# Patient Record
Sex: MALE | Race: ASIAN | NOT HISPANIC OR LATINO | Employment: FULL TIME | URBAN - METROPOLITAN AREA
[De-identification: names, ages, dates, MRNs, and addresses within clinical notes are randomized per-mention and may not be internally consistent; named-entity substitution may affect disease eponyms.]

---

## 2017-03-21 ENCOUNTER — OFFICE VISIT - HEALTHEAST (OUTPATIENT)
Dept: FAMILY MEDICINE | Facility: CLINIC | Age: 42
End: 2017-03-21

## 2017-03-21 DIAGNOSIS — R50.9 FEVER: ICD-10-CM

## 2017-03-21 DIAGNOSIS — J02.0 STREP THROAT: ICD-10-CM

## 2019-05-21 ENCOUNTER — OFFICE VISIT - HEALTHEAST (OUTPATIENT)
Dept: FAMILY MEDICINE | Facility: CLINIC | Age: 44
End: 2019-05-21

## 2019-05-21 DIAGNOSIS — J02.0 STREP PHARYNGITIS: ICD-10-CM

## 2019-05-21 DIAGNOSIS — J30.2 SEASONAL ALLERGIC RHINITIS, UNSPECIFIED TRIGGER: ICD-10-CM

## 2019-05-21 LAB — DEPRECATED S PYO AG THROAT QL EIA: ABNORMAL

## 2019-05-21 RX ORDER — ALBUTEROL SULFATE 90 UG/1
2 AEROSOL, METERED RESPIRATORY (INHALATION) EVERY 6 HOURS PRN
Qty: 1 EACH | Refills: 0 | Status: SHIPPED | OUTPATIENT
Start: 2019-05-21

## 2021-05-30 VITALS — WEIGHT: 144.3 LBS

## 2021-06-02 VITALS — WEIGHT: 145.1 LBS

## 2021-06-09 NOTE — PROGRESS NOTES
ASSESSMENT:   1. Strep throat  amoxicillin (AMOXIL) 500 MG capsule   2. Fever  Influenza A/B Rapid Test    Rapid Strep A Screen-Throat    ibuprofen (ADVIL,MOTRIN) 200 MG tablet        PLAN:  Rx: amoxicillin for strep throat.   Change toothbrush in 24 hours.  Contagious for 24 hours after starting antibiotic.  Ibuprofen 400mg every 6-8 hours as needed.  Take each dose with food.      F/u with PCP if not improving in 3-5 days, sooner if worsening.       SUBJECTIVE:   Devonte Suarez is a 41 y.o. male presents today with 3 days complaint of sore throat. He also complains of rhinorrhea, nasal congestion, cough, decreased appetite, subjective fever, sore throat, body aches, nausea.  Tolerating light diet and drinking good fluids Sick contacts: daughter has similar symptoms. Has tried Dayquil without relief.     Denies vomiting,diarrhea.    There is no problem list on file for this patient.      History   Smoking Status     Not on file   Smokeless Tobacco     Not on file       Current Medications:  No current outpatient prescriptions on file prior to visit.     No current facility-administered medications on file prior to visit.        Allergies:   No Known Allergies    OBJECTIVE:   Vitals:    03/21/17 1536   BP: 120/78   Pulse: (!) 104   Resp: 20   Temp: 99  F (37.2  C)   TempSrc: Oral   SpO2: 99%   Weight: 144 lb 4.8 oz (65.5 kg)     Physical exam reveals a pleasant 41 y.o. male.   Appears healthy, alert, cooperative and in NAD.  Eyes:  No conjunctivitis, lids normal.   Ears:  normal TMs bilaterally  Nose:    Mucosa normal. Scant clear rhinorrhea.  Mouth: Oral mucosa pink and moist, moderate erythema of tonsils. 2+ tonsillar hypertrophy. No exudate or palatal petechiae. Uvula midline.    Lymph: moderate tender anterior cervical LAD  Lungs: Chest is clear, no wheezing, rhonchi or rales. Symmetric air entry throughout both lung fields.  Heart: regular rate and rhythm, no murmur, rub or gallop  Abdomen: soft, nontender. No  masses or hepatosplenomegaly  Skin: pink, warm, dry, and without lesions on limited skin exam.     Recent Results (from the past 24 hour(s))   Influenza A/B Rapid Test   Result Value Ref Range    Influenza  A, Rapid Antigen No Influenza A antigen detected No Influenza A antigen detected    Influenza B, Rapid Antigen No Influenza B antigen detected No Influenza B antigen detected   Rapid Strep A Screen-Throat   Result Value Ref Range    Rapid Strep A Antigen Group A Strep detected (!) No Group A Strep detected

## 2021-06-17 NOTE — PATIENT INSTRUCTIONS - HE
Patient Instructions by Alexandro Solorzano PA-C at 5/21/2019  2:10 PM     Author: Alexandro Solorzano PA-C Service: -- Author Type: Physician Assistant    Filed: 5/21/2019  2:58 PM Encounter Date: 5/21/2019 Status: Addendum    : Alexandro Solorzano PA-C (Physician Assistant)    Related Notes: Original Note by Alexandro Solorzano PA-C (Physician Assistant) filed at 5/21/2019  2:56 PM       Your rapid strep test was positive today. We will treat with a course of antibiotics. Please complete the full course of antibiotics. You can take your medication with food and with a probiotic such as Culturelle to prevent stomach irritation. You will be contagious for 24 hours following initiation of the medication.    You may use Tylenol or Motrin for pain and fevers.    May drink warm tea, gargle saline solution, or use throat lozenges to sooth throat pain.    Change toothbrush after 48 hours of starting the antibiotic to prevent reinfection.    Watch for resolution of symptoms in the next few days. If you continue to have high fevers, begin to have difficulty swallowing or breathing, notice worsening neck pain, or difficulty moving neck, please return to clinic or present to the emergency room immediately. Otherwise, follow up with your primary care provider as needed.    Patient Education     Allergic Rhinitis  Allergic rhinitis is an allergic reaction that affects the nose, and often the eyes. Its often known as nasal allergies. Nasal allergies are often due to things in the environment that are breathed in. Depending what you are sensitive to, nasal allergies may occur only during certain seasons. Or they may occur year round. Common indoor allergens include house dust mites, mold, cockroaches, and pet dander. Outdoor allergens include pollen from trees, grasses, and weeds.   Symptoms include a drippy, stuffy, and itchy nose. They also include sneezing and red and itchy eyes. You may feel tired more often. Severe  allergies may also affect your breathing and trigger a condition called asthma.   Tests can be done to see what allergens are affecting you. You may be referred to an allergy specialist for testing and further evaluation.  Home care  Your healthcare provider may prescribe medicines to help relieve allergy symptoms. These may include oral medicines, nasal sprays, or eye drops.  Ask your provider for advice on how to avoid substances that you are allergic to. Below are a few tips for each type of allergen.  Pet dander:    Do not have pets with fur and feathers.    If you can't avoid having a pet, keep it out of your bedroom and off upholstered furniture.  Pollen:    When pollen counts are high, keep windows of your car and home closed. If possible, use an air conditioner instead.    Wear a filter mask when mowing or doing yard work.  House dust mites:    Wash bedding every week in warm water and detergent and dry on a hot setting.    Cover the mattress, box spring, and pillows with allergy covers.     If possible, sleep in a room with no carpet, curtains, or upholstered furniture.  Cockroaches:    Store food in sealed containers.    Remove garbage from the home promptly.    Fix water leaks  Mold:    Keep humidity low by using a dehumidifier or air conditioner. Keep the dehumidifier and air conditioner clean and free of mold.    Clean moldy areas with bleach and water.  In general:    Vacuum once or twice a week. If possible, use a vacuum with a high-efficiency particulate air (HEPA) filter.    Do not smoke. Avoid cigarette smoke. Cigarette smoke is an irritant that can make symptoms worse.  Follow-up care  Follow up as advised by the healthcare provider or our staff. If you were referred to an allergy specialist, make this appointment promptly.  When to seek medical advice  Call your healthcare provider right away if the following occur:    Coughing or wheezing    Fever of 100.4 F (38 C) or higher, or as directed by  your healthcare provider    Raised red bumps (hives)    Continuing symptoms, new symptoms, or worsening symptoms  Call 911 if you have:    Trouble breathing    Severe swelling of the face or severe itching of the eyes or mouth  Date Last Reviewed: 3/1/2017    1592-6236 [employer]. [address] [place] [address]. All rights reserved. This information is not intended as a substitute for professional medical care. Always follow your healthcare professional's instructions.

## 2021-06-27 NOTE — PROGRESS NOTES
Progress Notes by Alexandro Solorzano PA-C at 5/21/2019  2:10 PM     Author: Alexandro Solorzano PA-C Service: -- Author Type: Physician Assistant    Filed: 5/23/2019  7:33 PM Encounter Date: 5/21/2019 Status: Signed    : Alexandro Solorzano PA-C (Physician Assistant)         Assessment:       Strep throat.  Allergic rhinitis.      Plan:       Flonase.  Albuterol.  Penicillin.  Change toothbrush.  Probitoics.  Salt water gargles, throat lozenges, warm tea with honey PRN.  Discussed signs of worsening symptoms and when to follow-up with PCP if no symptom improvement.      Patient Instructions   Your rapid strep test was positive today. We will treat with a course of antibiotics. Please complete the full course of antibiotics. You can take your medication with food and with a probiotic such as Culturelle to prevent stomach irritation. You will be contagious for 24 hours following initiation of the medication.    You may use Tylenol or Motrin for pain and fevers.    May drink warm tea, gargle saline solution, or use throat lozenges to sooth throat pain.    Change toothbrush after 48 hours of starting the antibiotic to prevent reinfection.    Watch for resolution of symptoms in the next few days. If you continue to have high fevers, begin to have difficulty swallowing or breathing, notice worsening neck pain, or difficulty moving neck, please return to clinic or present to the emergency room immediately. Otherwise, follow up with your primary care provider as needed.    Patient Education     Allergic Rhinitis  Allergic rhinitis is an allergic reaction that affects the nose, and often the eyes. Its often known as nasal allergies. Nasal allergies are often due to things in the environment that are breathed in. Depending what you are sensitive to, nasal allergies may occur only during certain seasons. Or they may occur year round. Common indoor allergens include house dust mites, mold, cockroaches, and pet dander.  Outdoor allergens include pollen from trees, grasses, and weeds.   Symptoms include a drippy, stuffy, and itchy nose. They also include sneezing and red and itchy eyes. You may feel tired more often. Severe allergies may also affect your breathing and trigger a condition called asthma.   Tests can be done to see what allergens are affecting you. You may be referred to an allergy specialist for testing and further evaluation.  Home care  Your healthcare provider may prescribe medicines to help relieve allergy symptoms. These may include oral medicines, nasal sprays, or eye drops.  Ask your provider for advice on how to avoid substances that you are allergic to. Below are a few tips for each type of allergen.  Pet dander:    Do not have pets with fur and feathers.    If you can't avoid having a pet, keep it out of your bedroom and off upholstered furniture.  Pollen:    When pollen counts are high, keep windows of your car and home closed. If possible, use an air conditioner instead.    Wear a filter mask when mowing or doing yard work.  House dust mites:    Wash bedding every week in warm water and detergent and dry on a hot setting.    Cover the mattress, box spring, and pillows with allergy covers.     If possible, sleep in a room with no carpet, curtains, or upholstered furniture.  Cockroaches:    Store food in sealed containers.    Remove garbage from the home promptly.    Fix water leaks  Mold:    Keep humidity low by using a dehumidifier or air conditioner. Keep the dehumidifier and air conditioner clean and free of mold.    Clean moldy areas with bleach and water.  In general:    Vacuum once or twice a week. If possible, use a vacuum with a high-efficiency particulate air (HEPA) filter.    Do not smoke. Avoid cigarette smoke. Cigarette smoke is an irritant that can make symptoms worse.  Follow-up care  Follow up as advised by the healthcare provider or our staff. If you were referred to an allergy specialist,  make this appointment promptly.  When to seek medical advice  Call your healthcare provider right away if the following occur:    Coughing or wheezing    Fever of 100.4 F (38 C) or higher, or as directed by your healthcare provider    Raised red bumps (hives)    Continuing symptoms, new symptoms, or worsening symptoms  Call 911 if you have:    Trouble breathing    Severe swelling of the face or severe itching of the eyes or mouth  Date Last Reviewed: 3/1/2017    9051-4568 [employer]. [address] [place] [address]. All rights reserved. This information is not intended as a substitute for professional medical care. Always follow your healthcare professional's instructions.             Subjective:        History was provided by the patient. Is here with his wife and son.  Devonte Suarez is a 43 y.o. male who presents for evaluation of a sore throat. Associated symptoms include rhinorrhea, sneezing, and itchy eyes. Onset of symptoms was 1 day ago, gradually worsening since that time.  He is drinking plenty of fluids. He has not had recent close exposure to someone with proven streptococcal pharyngitis. Patient denies fevers and shortness of breath. He has history of seasonal allergies for which he uses Flonase and an albuterol inhaler with good relief.    The following portions of the patient's history were reviewed and updated as appropriate: allergies, current medications and problem list.    Review of Systems  Pertinent items are noted in HPI.    Allergies  No Known Allergies      Objective:       /76 (Patient Site: Right Arm, Patient Position: Sitting, Cuff Size: Adult Regular)   Pulse 73   Temp 98  F (36.7  C)   Resp 20   Wt 145 lb 1.6 oz (65.8 kg)   SpO2 97%   General appearance: alert, appears stated age, cooperative, no distress and non-toxic  Head: Normocephalic, without obvious abnormality, atraumatic, sinuses nontender to percussion  Ears: TM's intact with mucoid fluid, no  erythema or bulging; external ears normal  Nose: no discharge  Throat: moderate posterior oropharynx ertyhema; no tonsil swelling or exudate; MMM, lips and tongue normal  Neck: mild anterior cervical adenopathy and supple, symmetrical, trachea midline  Lungs: clear to auscultation bilaterally  Heart: regular rate and rhythm, S1, S2 normal, no murmur, click, rub or gallop    Lab Results    Recent Results (from the past 24 hour(s))   Rapid Strep A Screen-Throat   Result Value Ref Range    Rapid Strep A Antigen Group A Strep detected (!) No Group A Strep detected, presumptive negative       I personally reviewed these results and discussed findings with the patient.

## 2022-12-29 ENCOUNTER — OFFICE VISIT (OUTPATIENT)
Dept: FAMILY MEDICINE | Facility: CLINIC | Age: 47
End: 2022-12-29
Payer: COMMERCIAL

## 2022-12-29 VITALS
TEMPERATURE: 98.3 F | SYSTOLIC BLOOD PRESSURE: 151 MMHG | RESPIRATION RATE: 16 BRPM | OXYGEN SATURATION: 98 % | DIASTOLIC BLOOD PRESSURE: 97 MMHG | HEART RATE: 85 BPM | WEIGHT: 148 LBS

## 2022-12-29 DIAGNOSIS — J30.2 SEASONAL ALLERGIC RHINITIS, UNSPECIFIED TRIGGER: Primary | ICD-10-CM

## 2022-12-29 DIAGNOSIS — R03.0 ELEVATED BLOOD PRESSURE READING WITHOUT DIAGNOSIS OF HYPERTENSION: ICD-10-CM

## 2022-12-29 PROCEDURE — 99203 OFFICE O/P NEW LOW 30 MIN: CPT | Performed by: PHYSICIAN ASSISTANT

## 2022-12-29 RX ORDER — FLUTICASONE PROPIONATE 50 MCG
2 SPRAY, SUSPENSION (ML) NASAL DAILY
Qty: 18 ML | Refills: 0 | Status: SHIPPED | OUTPATIENT
Start: 2022-12-29 | End: 2023-01-19

## 2022-12-29 RX ORDER — CETIRIZINE HYDROCHLORIDE 10 MG/1
10 TABLET ORAL DAILY PRN
Qty: 60 TABLET | Refills: 0 | Status: SHIPPED | OUTPATIENT
Start: 2022-12-29

## 2022-12-29 NOTE — PATIENT INSTRUCTIONS
Improve your sinus hygiene by:  Flonase/fluticasone nasal spray 1-2 sprays in each nostril once a day. This may take several days to become effective.     Zyrtec can be helpful if your symptoms get worse or have  a bad day if congestion, sneezing or runny nose. But only take this as needed

## 2022-12-29 NOTE — PROGRESS NOTES
Chief Complaint   Patient presents with     Sinus Problem     Pressure on face, trouble of breathing, congested x 3 months        ASSESSMENT/PLAN:  Devonte was seen today for sinus problem.    Diagnoses and all orders for this visit:    Seasonal allergic rhinitis, unspecified trigger  -     cetirizine (ZYRTEC) 10 MG tablet; Take 1 tablet (10 mg) by mouth daily as needed for rhinitis  -     fluticasone (FLONASE) 50 MCG/ACT nasal spray; Spray 2 sprays into both nostrils daily for 21 days    Elevated blood pressure reading without diagnosis of hypertension  -     Primary Care Referral; Future    Patient appears well today with some what sounds like chronic rhinitis likely allergy related.  Started on Flonase and can use Zyrtec as needed.  Elevated BP today.  Asymptomatic.  No previous to compare to.  Discussed home blood pressure logs and establish care with PCP    Daniel Ziegler PA-C      SUBJECTIVE:  Devonte is a 47 year old male who presents to urgent care withPatient with several years of nasal congestion, sneezing and runny nose.  Has been treated with a nasal spray in the past.  He otherwise feels well.  No shortness of breath, chest pain, sinus pain or pressure, no fevers or chills    He also has some concerns because his blood pressure was elevated today.  ROS: Pertinent ROS neg other than the symptoms noted above in the HPI.     OBJECTIVE:  BP (!) 151/97 (BP Location: Right arm, Patient Position: Sitting, Cuff Size: Adult Regular)   Pulse 85   Temp 98.3  F (36.8  C) (Oral)   Resp 16   Wt 67.1 kg (148 lb)   SpO2 98%    GENERAL: healthy, alert and no distress  EYES: Eyes grossly normal to inspection, PERRL and conjunctivae and sclerae normal  HENT: ear canals and TM's normal, nose and mouth without ulcers or lesions, mild nasal mucosal edema    DIAGNOSTICS    No results found for any visits on 12/29/22.     No current outpatient medications on file.     No current facility-administered medications for  this visit.      There is no problem list on file for this patient.     No past medical history on file.  No past surgical history on file.  No family history on file.  Social History     Tobacco Use     Smoking status: Never     Smokeless tobacco: Never   Substance Use Topics     Alcohol use: Yes     Comment: occasionally              The plan of care was discussed with the patient. They understand and agree with the course of treatment prescribed. A printed summary was given including instructions and medications.  The use of Dragon/[employer] dictation services may have been used to construct the content in this note; any grammatical or spelling errors are non-intentional. Please contact the author of this note directly if you are in need of any clarification.

## 2025-01-04 ENCOUNTER — OFFICE VISIT (OUTPATIENT)
Dept: URGENT CARE | Facility: URGENT CARE | Age: 50
End: 2025-01-04
Payer: COMMERCIAL

## 2025-01-04 VITALS
TEMPERATURE: 99.7 F | SYSTOLIC BLOOD PRESSURE: 130 MMHG | HEART RATE: 105 BPM | WEIGHT: 148.2 LBS | OXYGEN SATURATION: 98 % | RESPIRATION RATE: 18 BRPM | DIASTOLIC BLOOD PRESSURE: 82 MMHG

## 2025-01-04 DIAGNOSIS — K12.2 UVULITIS: Primary | ICD-10-CM

## 2025-01-04 DIAGNOSIS — J30.2 SEASONAL ALLERGIC RHINITIS, UNSPECIFIED TRIGGER: ICD-10-CM

## 2025-01-04 DIAGNOSIS — J02.9 SORE THROAT: ICD-10-CM

## 2025-01-04 LAB
DEPRECATED S PYO AG THROAT QL EIA: NEGATIVE
S PYO DNA THROAT QL NAA+PROBE: NOT DETECTED

## 2025-01-04 PROCEDURE — 87651 STREP A DNA AMP PROBE: CPT | Performed by: STUDENT IN AN ORGANIZED HEALTH CARE EDUCATION/TRAINING PROGRAM

## 2025-01-04 PROCEDURE — 99213 OFFICE O/P EST LOW 20 MIN: CPT | Performed by: STUDENT IN AN ORGANIZED HEALTH CARE EDUCATION/TRAINING PROGRAM

## 2025-01-04 RX ORDER — ALBUTEROL SULFATE 90 UG/1
2 INHALANT RESPIRATORY (INHALATION) EVERY 6 HOURS PRN
Qty: 18 G | Refills: 1 | Status: SHIPPED | OUTPATIENT
Start: 2025-01-04

## 2025-01-04 RX ORDER — FLUTICASONE PROPIONATE 50 MCG
2 SPRAY, SUSPENSION (ML) NASAL DAILY
COMMUNITY
Start: 2024-12-13

## 2025-01-04 NOTE — PROGRESS NOTES
Assessment & Plan     Uvulitis  - amoxicillin-clavulanate (AUGMENTIN) 875-125 MG tablet  Dispense: 20 tablet; Refill: 0    Sore throat  - Streptococcus A Rapid Screen w/Reflex to PCR  - Group A Streptococcus PCR Throat Swab    Seasonal allergic rhinitis, unspecified trigger  - albuterol (PROAIR HFA/PROVENTIL HFA/VENTOLIN HFA) 108 (90 Base) MCG/ACT inhaler  Dispense: 18 g; Refill: 1         No follow-ups on file.    MIKALA Jacobs [facility] OJ Whitney is a 49 year old male who presents to clinic today for the following health issues:  Chief Complaint   Patient presents with    Pharyngitis     X1 week Sore throat with cough, fever 101.4 recorded this morning, and body aches. Tylenol, Dayquil, Nyquil taken with no relief. Patient also reports using his Albuterol Inhaler with some relief.          1/4/2025     2:06 PM   Additional Questions   Roomed by Chong Cagle   Accompanied by Wife- Naw     HPI      Review of Systems  Constitutional, HEENT, cardiovascular, pulmonary, GI, , musculoskeletal, neuro, skin, endocrine and psych systems are negative, except as otherwise noted.      Objective    /82 (BP Location: Right arm, Patient Position: Sitting, Cuff Size: Adult Regular)   Pulse 105   Temp 99.7  F (37.6  C) (Oral)   Resp 18   Wt 67.2 kg (148 lb 3.2 oz)   SpO2 98%   Physical Exam   GENERAL: alert and no distress  EYES: Eyes grossly normal to inspection, PERRL and conjunctivae and sclerae normal  HENT: normal cephalic/atraumatic, ear canals and TM's normal, nasal mucosa edematous , oral mucous membranes moist, uvula elongated and swollen at proximal with serous lesions, tonsillar hypertrophy, and tonsillar erythema  RESP: lungs clear to auscultation - no rales, rhonchi or wheezes  CV: regular rate and rhythm, normal S1 S2, no S3 or S4, no murmur, click or rub, no peripheral edema  MS: no gross musculoskeletal defects noted, no edema  SKIN: no  suspicious lesions or rashes  NEURO: Normal strength and tone, mentation intact and speech normal  PSYCH: mentation appears normal, affect normal/bright    Results for orders placed or performed in visit on 01/04/25 (from the past 24 hours)   Streptococcus A Rapid Screen w/Reflex to PCR    Specimen: Throat; Swab   Result Value Ref Range    Group A Strep antigen Negative Negative   Group A Streptococcus PCR Throat Swab    Specimen: Throat; Swab   Result Value Ref Range    Group A strep by PCR Not Detected Not Detected    Narrative    The Xpert Xpress Strep A test, performed on the [employer], is a rapid, qualitative in vitro diagnostic test for the detection of Streptococcus pyogenes (Group A ß-hemolytic Streptococcus, Strep A) in throat swab specimens from patients with signs and symptoms of pharyngitis. The Xpert Xpress Strep A test can be used as an aid in the diagnosis of Group A Streptococcal pharyngitis. The assay is not intended to monitor treatment for Group A Streptococcus infections. The Xpert Xpress Strep A test utilizes an automated real-time polymerase chain reaction (PCR) to detect Streptococcus pyogenes DNA.

## 2025-01-04 NOTE — LETTER
January 4, 2025      Devonte Suarez  [address] [place] [address]        To Whom It May Concern:    Devonte Suarez  was seen on 1/4/25.  Please excuse him from work 1/2, 1/5 and 1/6 due to illness.        Sincerely,        MIKALA Jacobs CNP    Electronically signed

## 2025-01-04 NOTE — PATIENT INSTRUCTIONS
Start antibiotic for treatment of infection of throat and uvula - amoxicillin/clavulanate, twice daily for 10 days.    Follow up if symptoms persist or worsen.      Nancy Nicole, CNP